# Patient Record
Sex: FEMALE | URBAN - METROPOLITAN AREA
[De-identification: names, ages, dates, MRNs, and addresses within clinical notes are randomized per-mention and may not be internally consistent; named-entity substitution may affect disease eponyms.]

---

## 2023-01-24 ENCOUNTER — APPOINTMENT (OUTPATIENT)
Dept: URBAN - METROPOLITAN AREA CLINIC 213 | Age: 78
Setting detail: DERMATOLOGY
End: 2023-01-29

## 2023-01-24 PROBLEM — D48.5 NEOPLASM OF UNCERTAIN BEHAVIOR OF SKIN: Status: ACTIVE | Noted: 2023-01-24

## 2023-01-24 PROCEDURE — OTHER BIOPSY BY SHAVE METHOD: OTHER

## 2023-01-24 PROCEDURE — OTHER COUNSELING: OTHER

## 2023-01-24 PROCEDURE — 69100 BIOPSY OF EXTERNAL EAR: CPT

## 2023-01-24 NOTE — PROCEDURE: COUNSELING
Left a message for patient to call us back to schedule 1 year follow up with Dr. Johnson   Detail Level: Detailed